# Patient Record
Sex: FEMALE | Race: WHITE | URBAN - METROPOLITAN AREA
[De-identification: names, ages, dates, MRNs, and addresses within clinical notes are randomized per-mention and may not be internally consistent; named-entity substitution may affect disease eponyms.]

---

## 2022-03-18 PROBLEM — Z01.419 ENCOUNTER FOR ANNUAL ROUTINE GYNECOLOGICAL EXAMINATION: Status: ACTIVE | Noted: 2020-09-18

## 2022-03-19 PROBLEM — T83.32XA IUD STRINGS LOST: Status: ACTIVE | Noted: 2021-09-20

## 2022-03-20 PROBLEM — F32.81 PMDD (PREMENSTRUAL DYSPHORIC DISORDER): Status: ACTIVE | Noted: 2019-09-04

## 2022-06-06 ENCOUNTER — TELEPHONE (OUTPATIENT)
Dept: OBGYN CLINIC | Age: 32
End: 2022-06-06

## 2022-06-06 NOTE — TELEPHONE ENCOUNTER
Patient LM stating she is applying for private health insurance. She stated the  request that she get a letter from us regarding her fluoxetine. Patient states that she is taking fluoxetine for anxiety and the letter needs to stated that we are treating her for it and we do not see anything changing. Discussed with La and she stated that we could do a letter.  dandy